# Patient Record
Sex: MALE | Race: WHITE | NOT HISPANIC OR LATINO | Employment: FULL TIME | URBAN - METROPOLITAN AREA
[De-identification: names, ages, dates, MRNs, and addresses within clinical notes are randomized per-mention and may not be internally consistent; named-entity substitution may affect disease eponyms.]

---

## 2024-04-10 ENCOUNTER — OFFICE VISIT (OUTPATIENT)
Dept: NEUROSURGERY | Facility: CLINIC | Age: 64
End: 2024-04-10
Payer: COMMERCIAL

## 2024-04-10 VITALS
DIASTOLIC BLOOD PRESSURE: 71 MMHG | SYSTOLIC BLOOD PRESSURE: 136 MMHG | BODY MASS INDEX: 23.89 KG/M2 | HEIGHT: 69 IN | WEIGHT: 161.3 LBS | HEART RATE: 67 BPM

## 2024-04-10 DIAGNOSIS — M47.812 CERVICAL SPONDYLOSIS WITHOUT MYELOPATHY: Primary | ICD-10-CM

## 2024-04-10 DIAGNOSIS — M50.20 DISPLACEMENT OF CERVICAL INTERVERTEBRAL DISC WITHOUT MYELOPATHY: ICD-10-CM

## 2024-04-10 RX ORDER — LEVOTHYROXINE SODIUM 0.05 MG/1
TABLET ORAL
COMMUNITY

## 2024-04-10 RX ORDER — LISINOPRIL 10 MG/1
TABLET ORAL
COMMUNITY

## 2024-04-10 RX ORDER — EZETIMIBE 10 MG/1
1 TABLET ORAL DAILY
COMMUNITY

## 2024-04-10 RX ORDER — ATORVASTATIN CALCIUM 20 MG/1
TABLET, FILM COATED ORAL
COMMUNITY

## 2024-04-10 NOTE — PROGRESS NOTES
"Chief Complaint  Neck Pain (Neck pain that radiates into left shoulder)    Subjective          Harshil Escobar who is a 63 y.o. year old male who presents to [facility] for evaluation of cervical spine.  History of Present Illness  Pt with concerns of shoulder pain. First started on the right side, in the posterior shoulder. He saw Orthopedic and received an injection in the right shoulder which took the pain immediately. He was referred to physical therapy. Started doing exercises and developed pain into the left shoulder. This progressed into weakness in the proximal muscles. The shoulder pain progressed. He had a MRI of the left Shoulder, which demonstrated arthritis changes and possible labral tear. He saw Orthopedics in [facility], who gave an injection in the left shoulder and did not recommend surgery at that time.       The patient complains of pain located in the cervical spine.  Patients states the pain has been present for several years.  The pain came on gradually.  The pain scale level is 1-2/10.  The pain does not radiate. .  The pain is waxing/waning and described as aching.  The pain is worse in the morning. Patient states  driving and sleeping in certain positions makes the pain worse.  Patient states rest and stretching makes the pain better.    Associated Symptoms Include: Denies numbness and tingling  Conservative Interventions Include:  Independent exercises.    Was this the result of an injury or accident? : No    History of Previous Spinal Surgery?: No    Nicotine use: non-smoker    BMI: Body mass index is 23.82 kg/m².      Review of Systems   Musculoskeletal:  Positive for myalgias, neck pain, neck stiffness and bursitis.   Neurological:  Positive for weakness.   All other systems reviewed and are negative.       Objective   Vital Signs:   /71 (BP Location: Right arm, Patient Position: Sitting, Cuff Size: Adult)   Pulse 67   Ht 175.3 cm (69\")   Wt " 73.2 kg (161 lb 4.8 oz)   BMI 23.82 kg/m²       Physical Exam  Vitals reviewed.   Constitutional:       Appearance: Normal appearance.   Musculoskeletal:      Right shoulder: No tenderness. Normal range of motion.      Left shoulder: Tenderness present. Decreased range of motion.      Cervical back: Tenderness present. Pain with movement present. Decreased range of motion.   Neurological:      Mental Status: He is alert and oriented to person, place, and time.      Motor: Motor strength is normal.     Gait: Gait is intact.      Deep Tendon Reflexes:      Reflex Scores:       Tricep reflexes are 2+ on the right side and 2+ on the left side.       Bicep reflexes are 2+ on the right side and 2+ on the left side.       Brachioradialis reflexes are 2+ on the right side and 2+ on the left side.       Neurologic Exam     Mental Status   Oriented to person, place, and time.   Level of consciousness: alert    Motor Exam   Muscle bulk: normal  Overall muscle tone: normal    Strength   Strength 5/5 throughout.     Sensory Exam   Light touch normal.     Gait, Coordination, and Reflexes     Gait  Gait: normal    Reflexes   Right brachioradialis: 2+  Left brachioradialis: 2+  Right biceps: 2+  Left biceps: 2+  Right triceps: 2+  Left triceps: 2+  Right Arroyo: absent  Left Arroyo: absent       Result Review :       Data reviewed : Radiologic studies MRI Cervical Spine on 12/21/23 at [facility] personally reviewed. Mild multilevel degenerative changes. At C5/6 there is a right sided disc protrusion, resulting minimal spinal stenosis and moderate right foraminal stenosis. At C6/7 there is left foraminal narrowing and endplate spurring. No cord signal change.         MRI Left Shoulder on 2/15/24 at [facility] demonstrating degenerative changes with superior labral tear.      Assessment and Plan    Diagnoses and all orders for this visit:    1. Cervical spondylosis without myelopathy (Primary)    2. Displacement of  cervical intervertebral disc without myelopathy    Pt presenting for evaluation of neck and shoulder pain. We reviewed his MRI Cervical Spine, demonstrating degenerative changes that would explain neck pain. This can refer into the cervical paraspinals, however the symptoms described in the shoulder do correlate more a shoulder pathology. He is not having radicular symptoms at this time. He will continue strengthening exercise and is aware to call should his neck pain worsen or should he develop pain down the arms.     Follow up as needed.       Follow Up   Return if symptoms worsen or fail to improve.  Patient was given instructions and counseling regarding his condition or for health maintenance advice.     -Follow up as needed